# Patient Record
Sex: MALE | URBAN - METROPOLITAN AREA
[De-identification: names, ages, dates, MRNs, and addresses within clinical notes are randomized per-mention and may not be internally consistent; named-entity substitution may affect disease eponyms.]

---

## 2022-03-21 ENCOUNTER — TELEPHONE ENCOUNTER (OUTPATIENT)
Dept: URBAN - METROPOLITAN AREA CLINIC 36 | Facility: CLINIC | Age: 57
End: 2022-03-21

## 2022-03-23 ENCOUNTER — TELEPHONE ENCOUNTER (OUTPATIENT)
Dept: URBAN - METROPOLITAN AREA CLINIC 36 | Facility: CLINIC | Age: 57
End: 2022-03-23

## 2022-03-24 ENCOUNTER — OFFICE VISIT (OUTPATIENT)
Dept: URBAN - METROPOLITAN AREA CLINIC 35 | Facility: CLINIC | Age: 57
End: 2022-03-24
Payer: COMMERCIAL

## 2022-03-24 VITALS
DIASTOLIC BLOOD PRESSURE: 76 MMHG | OXYGEN SATURATION: 97 % | WEIGHT: 213 LBS | BODY MASS INDEX: 28.85 KG/M2 | HEART RATE: 53 BPM | HEIGHT: 72 IN | SYSTOLIC BLOOD PRESSURE: 116 MMHG

## 2022-03-24 DIAGNOSIS — R10.12 LEFT UPPER QUADRANT ABDOMINAL PAIN: ICD-10-CM

## 2022-03-24 DIAGNOSIS — K76.9 LESION OF LIVER: ICD-10-CM

## 2022-03-24 PROCEDURE — 99204 OFFICE O/P NEW MOD 45 MIN: CPT | Performed by: PHYSICIAN ASSISTANT

## 2022-03-24 NOTE — HPI-ABDOMINAL PAIN
58 y/o male patient presents today with abdominal pain. The abdominal pain is located under his left rib cage and this started 9 months ago.  He states the pain is intermittent, and can be affected by the way he sits, sometimes by stress, drinking wine.  It feels like a cramp.  No palliative factors other than time.  He states he will have more cramping after eating a meal.  Patients denies experiencing RUQ pain, fatigue, weakness, weight loss, jaundice (yellowing of the skin), gastrointestinal bleeding, and confusion. He is not sure if he has had any abdominal swelling.   Patient has 2 bowel movements a day. Stools are normal and formed with/out melena, mucus, or blood in stool.  Patient denies heartburn, dysphagia, fever, and chills, nausea.  Occasional bloating/gas.  Notes some borborygami.  He will have this discomfort for 30 minutes or more.  He also notes when he walks, he will have a pull sensation when he's walking the dogs.  He also plays a lot of tennis.  He was initially given a probiotic by PCP, and he states it didn't do anything.  His PCP has ordered an MRI, but he hasn't done it yet.  Patient admits recent CT and  US of abdomen. US of abdomen (02.11.2022) Results: Pancreas is obscured by overlying bowel gas. Otherwise normal abdominal ultrasound examination  CT of abdomen (02.11.2022) Results: No acute abdominopelvic process, there is an ill-defined small indeterminate lesion within segment 6 of the liver.   Last Colonoscopy was performed in 2019 with diverticulosis findings and patient is not usre of the name of the provider that did it. He has never had an EGD.

## 2022-03-24 NOTE — HPI-ABNORMAL FINDINGS
Patient states he had a CT abd/pelvis on 03/02 of this year, and was told he has a lesion on his liver.  He is here to discuss this.

## 2022-03-31 ENCOUNTER — OFFICE VISIT (OUTPATIENT)
Dept: URBAN - METROPOLITAN AREA SURGERY CENTER 8 | Facility: SURGERY CENTER | Age: 57
End: 2022-03-31
Payer: COMMERCIAL

## 2022-03-31 DIAGNOSIS — R10.12 ABDOMINAL BURNING SENSATION IN LEFT UPPER QUADRANT: ICD-10-CM

## 2022-03-31 DIAGNOSIS — K31.89 ACQUIRED DEFORMITY OF DUODENUM: ICD-10-CM

## 2022-03-31 DIAGNOSIS — K29.60 ADENOPAPILLOMATOSIS GASTRICA: ICD-10-CM

## 2022-03-31 PROCEDURE — 43239 EGD BIOPSY SINGLE/MULTIPLE: CPT | Performed by: INTERNAL MEDICINE

## 2022-03-31 PROCEDURE — G8907 PT DOC NO EVENTS ON DISCHARG: HCPCS | Performed by: INTERNAL MEDICINE

## 2022-04-12 ENCOUNTER — DASHBOARD ENCOUNTERS (OUTPATIENT)
Age: 57
End: 2022-04-12

## 2022-04-14 ENCOUNTER — OFFICE VISIT (OUTPATIENT)
Dept: URBAN - METROPOLITAN AREA CLINIC 35 | Facility: CLINIC | Age: 57
End: 2022-04-14
Payer: COMMERCIAL

## 2022-04-14 VITALS
BODY MASS INDEX: 28.44 KG/M2 | HEIGHT: 72 IN | HEART RATE: 63 BPM | DIASTOLIC BLOOD PRESSURE: 82 MMHG | SYSTOLIC BLOOD PRESSURE: 126 MMHG | WEIGHT: 210 LBS | OXYGEN SATURATION: 95 %

## 2022-04-14 DIAGNOSIS — R10.12 LEFT UPPER QUADRANT ABDOMINAL PAIN: ICD-10-CM

## 2022-04-14 DIAGNOSIS — K76.9 LESION OF LIVER: ICD-10-CM

## 2022-04-14 PROBLEM — 300331000: Status: ACTIVE | Noted: 2022-03-24

## 2022-04-14 PROCEDURE — 99214 OFFICE O/P EST MOD 30 MIN: CPT | Performed by: PHYSICIAN ASSISTANT

## 2022-04-14 NOTE — HPI-ABNORMAL FINDINGS
Patient states he had a CT abd/pelvis on 03/02 of this year, and was told he has a lesion on his liver.  He did not get the followup MRI.

## 2022-04-14 NOTE — HPI-ABDOMINAL PAIN
Patient states pain is about the same. The abdominal pain is located under his left rib cage and this started 9 months ago.  He states the pain is intermittent, and can be affected by the way he sits, sometimes by stress, drinking wine.  It feels like a cramp.  No palliative factors other than time.  He states he will have more cramping after eating a meal.  He has been taking Advil lately for back pains, 1-2 every day for a week or so at a time.   He admits to drinking half a bottle of wine at night.  Patients denies experiencing RUQ pain, fatigue, weakness, weight loss, jaundice (yellowing of the skin), gastrointestinal bleeding, and confusion. He is not sure if he has had any abdominal swelling.   Patient has 2 bowel movements a day. Stools are normal and formed with/out melena, mucus, or blood in stool.  Patient denies heartburn, dysphagia, fever, and chills, nausea.  Occasional bloating/gas.  Notes some borborygami.  He will have this discomfort for 30 minutes or more.  He also notes when he walks, he will have a pull sensation when he's walking the dogs.  He also plays a lot of tennis.  He was initially given a probiotic by PCP, and he states it didn't do anything.  His PCP has ordered an MRI, but he hasn't done it yet.  Patient admits recent CT and  US of abdomen. US of abdomen (02.11.2022) Results: Pancreas is obscured by overlying bowel gas. Otherwise normal abdominal ultrasound examination  CT of abdomen (02.11.2022) Results: No acute abdominopelvic process, there is an ill-defined small indeterminate lesion within segment 6 of the liver.   Last Colonoscopy was performed in 2019 with diverticulosis findings and patient is not usre of the name of the provider that did it. He has never had an EGD.

## 2022-04-14 NOTE — HPI-ENDOSCOPY (EGD) FOLLOWUP
Patient presents today for a follow-up from the EGD that was done on 03/31/2022. Since having the procedure, patient denies any odynophagia, dysphagia, or globus sensation. There were no complications following the EGD.  Patient did/did not get the MRI done. He denies any improvement in the LUQ discomfort. Patient wonders if he still needs to get the MRI completed. No weight loss, no diarrhea.

## 2022-04-18 ENCOUNTER — OFFICE VISIT (OUTPATIENT)
Dept: URBAN - METROPOLITAN AREA CLINIC 35 | Facility: CLINIC | Age: 57
End: 2022-04-18